# Patient Record
Sex: FEMALE | Race: WHITE | NOT HISPANIC OR LATINO | URBAN - METROPOLITAN AREA
[De-identification: names, ages, dates, MRNs, and addresses within clinical notes are randomized per-mention and may not be internally consistent; named-entity substitution may affect disease eponyms.]

---

## 2023-03-18 LAB
ALANINE AMINOTRANSFERASE (SGPT) (U/L) IN SER/PLAS: 13 U/L (ref 7–45)
ALBUMIN (G/DL) IN SER/PLAS: 4.5 G/DL (ref 3.4–5)
ALKALINE PHOSPHATASE (U/L) IN SER/PLAS: 65 U/L (ref 33–110)
ASPARTATE AMINOTRANSFERASE (SGOT) (U/L) IN SER/PLAS: 17 U/L (ref 9–39)
BASOPHILS (10*3/UL) IN BLOOD BY AUTOMATED COUNT: 0.04 X10E9/L (ref 0–0.1)
BASOPHILS/100 LEUKOCYTES IN BLOOD BY AUTOMATED COUNT: 0.7 % (ref 0–2)
BILIRUBIN DIRECT (MG/DL) IN SER/PLAS: 0.1 MG/DL (ref 0–0.3)
BILIRUBIN TOTAL (MG/DL) IN SER/PLAS: 0.5 MG/DL (ref 0–1.2)
CHOLESTEROL (MG/DL) IN SER/PLAS: 256 MG/DL (ref 0–199)
EOSINOPHILS (10*3/UL) IN BLOOD BY AUTOMATED COUNT: 0.11 X10E9/L (ref 0–0.7)
EOSINOPHILS/100 LEUKOCYTES IN BLOOD BY AUTOMATED COUNT: 1.8 % (ref 0–6)
ERYTHROCYTE DISTRIBUTION WIDTH (RATIO) BY AUTOMATED COUNT: 11.7 % (ref 11.5–14.5)
ERYTHROCYTE MEAN CORPUSCULAR HEMOGLOBIN CONCENTRATION (G/DL) BY AUTOMATED: 31.4 G/DL (ref 32–36)
ERYTHROCYTE MEAN CORPUSCULAR VOLUME (FL) BY AUTOMATED COUNT: 93 FL (ref 80–100)
ERYTHROCYTES (10*6/UL) IN BLOOD BY AUTOMATED COUNT: 4.69 X10E12/L (ref 4–5.2)
HEMATOCRIT (%) IN BLOOD BY AUTOMATED COUNT: 43.7 % (ref 36–46)
HEMOGLOBIN (G/DL) IN BLOOD: 13.7 G/DL (ref 12–16)
IMMATURE GRANULOCYTES/100 LEUKOCYTES IN BLOOD BY AUTOMATED COUNT: 0.7 % (ref 0–0.9)
LEUKOCYTES (10*3/UL) IN BLOOD BY AUTOMATED COUNT: 6 X10E9/L (ref 4.4–11.3)
LYMPHOCYTES (10*3/UL) IN BLOOD BY AUTOMATED COUNT: 2.07 X10E9/L (ref 1.2–4.8)
LYMPHOCYTES/100 LEUKOCYTES IN BLOOD BY AUTOMATED COUNT: 34.6 % (ref 13–44)
MONOCYTES (10*3/UL) IN BLOOD BY AUTOMATED COUNT: 0.44 X10E9/L (ref 0.1–1)
MONOCYTES/100 LEUKOCYTES IN BLOOD BY AUTOMATED COUNT: 7.4 % (ref 2–10)
NEUTROPHILS (10*3/UL) IN BLOOD BY AUTOMATED COUNT: 3.28 X10E9/L (ref 1.2–7.7)
NEUTROPHILS/100 LEUKOCYTES IN BLOOD BY AUTOMATED COUNT: 54.8 % (ref 40–80)
NRBC (PER 100 WBCS) BY AUTOMATED COUNT: 0 /100 WBC (ref 0–0)
PLATELETS (10*3/UL) IN BLOOD AUTOMATED COUNT: 258 X10E9/L (ref 150–450)
PROTEIN TOTAL: 6.7 G/DL (ref 6.4–8.2)
TRIGLYCERIDE (MG/DL) IN SER/PLAS: 87 MG/DL (ref 0–149)

## 2023-06-14 LAB
ALANINE AMINOTRANSFERASE (SGPT) (U/L) IN SER/PLAS: 16 U/L (ref 7–45)
ALBUMIN (G/DL) IN SER/PLAS: 4.6 G/DL (ref 3.4–5)
ALKALINE PHOSPHATASE (U/L) IN SER/PLAS: 80 U/L (ref 33–110)
ASPARTATE AMINOTRANSFERASE (SGOT) (U/L) IN SER/PLAS: 21 U/L (ref 9–39)
BASOPHILS (10*3/UL) IN BLOOD BY AUTOMATED COUNT: 0.04 X10E9/L (ref 0–0.1)
BASOPHILS/100 LEUKOCYTES IN BLOOD BY AUTOMATED COUNT: 0.6 % (ref 0–2)
BILIRUBIN DIRECT (MG/DL) IN SER/PLAS: 0.1 MG/DL (ref 0–0.3)
BILIRUBIN TOTAL (MG/DL) IN SER/PLAS: 0.7 MG/DL (ref 0–1.2)
CHOLESTEROL (MG/DL) IN SER/PLAS: 268 MG/DL (ref 0–199)
EOSINOPHILS (10*3/UL) IN BLOOD BY AUTOMATED COUNT: 0.14 X10E9/L (ref 0–0.7)
EOSINOPHILS/100 LEUKOCYTES IN BLOOD BY AUTOMATED COUNT: 1.9 % (ref 0–6)
ERYTHROCYTE DISTRIBUTION WIDTH (RATIO) BY AUTOMATED COUNT: 12.4 % (ref 11.5–14.5)
ERYTHROCYTE MEAN CORPUSCULAR HEMOGLOBIN CONCENTRATION (G/DL) BY AUTOMATED: 31.4 G/DL (ref 32–36)
ERYTHROCYTE MEAN CORPUSCULAR VOLUME (FL) BY AUTOMATED COUNT: 94 FL (ref 80–100)
ERYTHROCYTES (10*6/UL) IN BLOOD BY AUTOMATED COUNT: 4.86 X10E12/L (ref 4–5.2)
HEMATOCRIT (%) IN BLOOD BY AUTOMATED COUNT: 45.5 % (ref 36–46)
HEMOGLOBIN (G/DL) IN BLOOD: 14.3 G/DL (ref 12–16)
IMMATURE GRANULOCYTES/100 LEUKOCYTES IN BLOOD BY AUTOMATED COUNT: 0.7 % (ref 0–0.9)
LEUKOCYTES (10*3/UL) IN BLOOD BY AUTOMATED COUNT: 7.2 X10E9/L (ref 4.4–11.3)
LYMPHOCYTES (10*3/UL) IN BLOOD BY AUTOMATED COUNT: 2.23 X10E9/L (ref 1.2–4.8)
LYMPHOCYTES/100 LEUKOCYTES IN BLOOD BY AUTOMATED COUNT: 30.9 % (ref 13–44)
MONOCYTES (10*3/UL) IN BLOOD BY AUTOMATED COUNT: 0.55 X10E9/L (ref 0.1–1)
MONOCYTES/100 LEUKOCYTES IN BLOOD BY AUTOMATED COUNT: 7.6 % (ref 2–10)
NEUTROPHILS (10*3/UL) IN BLOOD BY AUTOMATED COUNT: 4.2 X10E9/L (ref 1.2–7.7)
NEUTROPHILS/100 LEUKOCYTES IN BLOOD BY AUTOMATED COUNT: 58.3 % (ref 40–80)
NRBC (PER 100 WBCS) BY AUTOMATED COUNT: 0 /100 WBC (ref 0–0)
PLATELETS (10*3/UL) IN BLOOD AUTOMATED COUNT: 277 X10E9/L (ref 150–450)
PROTEIN TOTAL: 7.1 G/DL (ref 6.4–8.2)
TRIGLYCERIDE (MG/DL) IN SER/PLAS: 110 MG/DL (ref 0–149)

## 2023-10-31 ENCOUNTER — DOCUMENTATION (OUTPATIENT)
Dept: UROLOGY | Facility: CLINIC | Age: 48
End: 2023-10-31
Payer: COMMERCIAL

## 2023-10-31 DIAGNOSIS — N30.10 INTERSTITIAL CYSTITIS: ICD-10-CM

## 2023-10-31 DIAGNOSIS — R39.89 BLADDER PAIN: ICD-10-CM

## 2023-10-31 DIAGNOSIS — R35.1 NOCTURIA: Primary | ICD-10-CM

## 2023-10-31 RX ORDER — AMITRIPTYLINE HYDROCHLORIDE 10 MG/1
10 TABLET, FILM COATED ORAL NIGHTLY
Qty: 90 TABLET | Refills: 3 | Status: SHIPPED | OUTPATIENT
Start: 2023-10-31 | End: 2024-10-30

## 2023-11-22 ENCOUNTER — LAB (OUTPATIENT)
Dept: LAB | Facility: LAB | Age: 48
End: 2023-11-22
Payer: COMMERCIAL

## 2023-11-22 DIAGNOSIS — R39.89 BLADDER PAIN: ICD-10-CM

## 2023-11-22 DIAGNOSIS — R35.1 NOCTURIA: ICD-10-CM

## 2023-11-22 PROCEDURE — 81003 URINALYSIS AUTO W/O SCOPE: CPT

## 2023-11-23 LAB
APPEARANCE UR: CLEAR
BILIRUB UR STRIP.AUTO-MCNC: NEGATIVE MG/DL
COLOR UR: YELLOW
GLUCOSE UR STRIP.AUTO-MCNC: NEGATIVE MG/DL
KETONES UR STRIP.AUTO-MCNC: NEGATIVE MG/DL
LEUKOCYTE ESTERASE UR QL STRIP.AUTO: NEGATIVE
NITRITE UR QL STRIP.AUTO: NEGATIVE
PH UR STRIP.AUTO: 6 [PH]
PROT UR STRIP.AUTO-MCNC: NEGATIVE MG/DL
RBC # UR STRIP.AUTO: NEGATIVE /UL
SP GR UR STRIP.AUTO: 1.01
UROBILINOGEN UR STRIP.AUTO-MCNC: <2 MG/DL

## 2024-03-25 ENCOUNTER — LAB (OUTPATIENT)
Dept: LAB | Facility: LAB | Age: 49
End: 2024-03-25
Payer: COMMERCIAL

## 2024-03-25 DIAGNOSIS — Z00.00 ENCOUNTER FOR GENERAL ADULT MEDICAL EXAMINATION WITHOUT ABNORMAL FINDINGS: Primary | ICD-10-CM

## 2024-03-25 PROCEDURE — 84132 ASSAY OF SERUM POTASSIUM: CPT

## 2024-03-25 PROCEDURE — 36415 COLL VENOUS BLD VENIPUNCTURE: CPT

## 2024-03-26 LAB — POTASSIUM SERPL-SCNC: 4 MMOL/L (ref 3.5–5.3)

## 2024-06-04 ENCOUNTER — LAB (OUTPATIENT)
Dept: LAB | Facility: LAB | Age: 49
End: 2024-06-04
Payer: COMMERCIAL

## 2024-06-04 DIAGNOSIS — Z85.828 PERSONAL HISTORY OF OTHER MALIGNANT NEOPLASM OF SKIN: Primary | ICD-10-CM

## 2024-06-04 LAB
ALBUMIN SERPL BCP-MCNC: 4.6 G/DL (ref 3.4–5)
ALP SERPL-CCNC: 71 U/L (ref 33–110)
ALT SERPL W P-5'-P-CCNC: 12 U/L (ref 7–45)
ANION GAP SERPL CALC-SCNC: 14 MMOL/L (ref 10–20)
AST SERPL W P-5'-P-CCNC: 19 U/L (ref 9–39)
BASOPHILS # BLD AUTO: 0.04 X10*3/UL (ref 0–0.1)
BASOPHILS NFR BLD AUTO: 0.7 %
BILIRUB SERPL-MCNC: 0.4 MG/DL (ref 0–1.2)
BUN SERPL-MCNC: 12 MG/DL (ref 6–23)
CALCIUM SERPL-MCNC: 9.5 MG/DL (ref 8.6–10.6)
CHLORIDE SERPL-SCNC: 105 MMOL/L (ref 98–107)
CHOLEST SERPL-MCNC: 253 MG/DL (ref 0–199)
CHOLESTEROL/HDL RATIO: 3.5
CO2 SERPL-SCNC: 28 MMOL/L (ref 21–32)
CREAT SERPL-MCNC: 0.76 MG/DL (ref 0.5–1.05)
EGFRCR SERPLBLD CKD-EPI 2021: >90 ML/MIN/1.73M*2
EOSINOPHIL # BLD AUTO: 0.23 X10*3/UL (ref 0–0.7)
EOSINOPHIL NFR BLD AUTO: 4.1 %
ERYTHROCYTE [DISTWIDTH] IN BLOOD BY AUTOMATED COUNT: 11.9 % (ref 11.5–14.5)
GLUCOSE SERPL-MCNC: 97 MG/DL (ref 74–99)
HCT VFR BLD AUTO: 42.9 % (ref 36–46)
HDLC SERPL-MCNC: 73 MG/DL
HGB BLD-MCNC: 13.6 G/DL (ref 12–16)
IMM GRANULOCYTES # BLD AUTO: 0.02 X10*3/UL (ref 0–0.7)
IMM GRANULOCYTES NFR BLD AUTO: 0.4 % (ref 0–0.9)
LDLC SERPL CALC-MCNC: 161 MG/DL
LYMPHOCYTES # BLD AUTO: 2.22 X10*3/UL (ref 1.2–4.8)
LYMPHOCYTES NFR BLD AUTO: 39.3 %
MCH RBC QN AUTO: 29.2 PG (ref 26–34)
MCHC RBC AUTO-ENTMCNC: 31.7 G/DL (ref 32–36)
MCV RBC AUTO: 92 FL (ref 80–100)
MONOCYTES # BLD AUTO: 0.5 X10*3/UL (ref 0.1–1)
MONOCYTES NFR BLD AUTO: 8.8 %
NEUTROPHILS # BLD AUTO: 2.64 X10*3/UL (ref 1.2–7.7)
NEUTROPHILS NFR BLD AUTO: 46.7 %
NON HDL CHOLESTEROL: 180 MG/DL (ref 0–149)
NRBC BLD-RTO: 0 /100 WBCS (ref 0–0)
PLATELET # BLD AUTO: 198 X10*3/UL (ref 150–450)
POTASSIUM SERPL-SCNC: 4.5 MMOL/L (ref 3.5–5.3)
PROT SERPL-MCNC: 6.9 G/DL (ref 6.4–8.2)
RBC # BLD AUTO: 4.66 X10*6/UL (ref 4–5.2)
SODIUM SERPL-SCNC: 142 MMOL/L (ref 136–145)
TRIGL SERPL-MCNC: 94 MG/DL (ref 0–149)
VLDL: 19 MG/DL (ref 0–40)
WBC # BLD AUTO: 5.7 X10*3/UL (ref 4.4–11.3)

## 2024-06-04 PROCEDURE — 85025 COMPLETE CBC W/AUTO DIFF WBC: CPT

## 2024-06-04 PROCEDURE — 80061 LIPID PANEL: CPT

## 2024-06-04 PROCEDURE — 36415 COLL VENOUS BLD VENIPUNCTURE: CPT

## 2024-06-04 PROCEDURE — 80053 COMPREHEN METABOLIC PANEL: CPT

## 2024-11-03 DIAGNOSIS — N30.10 INTERSTITIAL CYSTITIS: ICD-10-CM

## 2024-11-03 DIAGNOSIS — R35.1 NOCTURIA: ICD-10-CM

## 2024-11-05 NOTE — TELEPHONE ENCOUNTER
Detailed message left on pt VM asking if refill is needed. She will need her virtual annual fu scheduled.

## 2024-11-08 ENCOUNTER — TELEMEDICINE (OUTPATIENT)
Dept: UROLOGY | Facility: CLINIC | Age: 49
End: 2024-11-08
Payer: COMMERCIAL

## 2024-11-08 DIAGNOSIS — R35.1 NOCTURIA: ICD-10-CM

## 2024-11-08 DIAGNOSIS — N94.19 DYSPAREUNIA DUE TO MEDICAL CONDITION IN FEMALE: ICD-10-CM

## 2024-11-08 DIAGNOSIS — N30.10 INTERSTITIAL CYSTITIS: Primary | ICD-10-CM

## 2024-11-08 DIAGNOSIS — N32.81 OVERACTIVE BLADDER: ICD-10-CM

## 2024-11-08 PROCEDURE — 99442 PR PHYS/QHP TELEPHONE EVALUATION 11-20 MIN: CPT | Performed by: NURSE PRACTITIONER

## 2024-11-08 RX ORDER — AMITRIPTYLINE HYDROCHLORIDE 10 MG/1
10 TABLET, FILM COATED ORAL NIGHTLY
Qty: 30 TABLET | Refills: 11 | OUTPATIENT
Start: 2024-11-08

## 2024-11-08 RX ORDER — AMITRIPTYLINE HYDROCHLORIDE 10 MG/1
10 TABLET, FILM COATED ORAL NIGHTLY
Qty: 90 TABLET | Refills: 3 | Status: SHIPPED | OUTPATIENT
Start: 2024-11-08 | End: 2025-11-08

## 2024-11-08 NOTE — PROGRESS NOTES
11/08/24   [id number]    IC/OAB and dyspareunia    Virtual or Telephone Consent    A telephone visit (audio only) between the patient (at the originating site) and the provider (at the distant site) was utilized to provide this telehealth service.   Verbal consent was requested and obtained from Nell Kim on this date, 11/08/24 for a telehealth visit.        Subjective      HPI Nell Kim is a 49 y.o. 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;     No dyspareunia, no pelvic pain, now  No longer with [facility]NOR with exercises  Still taking amitriptyline 10 mg at bedtime, happy with this medicine, considering going off but low dose and doing well, doesn't want jeopardize how well she is doing;     No UTIs or hematuria;     Daughter in  now, working 3 days a week at her school now;     Parathyroidectomy done after blood work showed elevated calcium; blood work done for exhaustion;     December/Jan 2023 pneumonia-almost sepsis, that is when they found the kidney stones; no UTIs; no hematuria; stones r/t parathyroid;     12/8/23 CT abd/pel w IV Kidneys: At the superior pole of the left kidney, there is a 7 mm   calculus. At the inferior pole of the left kidney, there is an 8 mm calculus. No hydronephrosis.     Surgery done on kidney stones;     5/1/24 rbc 0-3/hpf normal range at [facility];   11/22/23 UA negative    PMH, PSH, FH, SH reviewed.    Objective     There were no vitals taken for this visit.   Physical Exam  Deferred for phone visit  Assessment/Plan   Problem List Items Addressed This Visit    None  Visit Diagnoses       Interstitial cystitis    -  Primary    Relevant Medications    amitriptyline (Elavil) 10 mg tablet    Nocturia        Relevant Medications    amitriptyline (Elavil) 10 mg tablet    Overactive bladder        Dyspareunia due to medical condition in female              No orders of the  defined types were placed in this encounter.     Refill amitriptyline given  Follow up next July in [place] yearly follow up  Nurse line [phone]     Appt. [place] next summer end of July T or Th 8:20 slot  DOM Pyle-CNP  Lab Results   Component Value Date    GLUCOSE 97 06/04/2024    CALCIUM 9.5 06/04/2024     06/04/2024    K 4.5 06/04/2024    CO2 28 06/04/2024     06/04/2024    BUN 12 06/04/2024    CREATININE 0.76 06/04/2024

## 2024-11-08 NOTE — PATIENT INSTRUCTIONS
Refill amitriptyline given  Follow up next July in [place] yearly follow up  Nurse line [phone]

## 2025-02-24 DIAGNOSIS — N30.10 INTERSTITIAL CYSTITIS: ICD-10-CM

## 2025-02-24 DIAGNOSIS — R35.1 NOCTURIA: ICD-10-CM

## 2025-02-24 RX ORDER — AMITRIPTYLINE HYDROCHLORIDE 10 MG/1
10 TABLET, FILM COATED ORAL NIGHTLY
Qty: 90 TABLET | Refills: 3 | Status: SHIPPED | OUTPATIENT
Start: 2025-02-24 | End: 2026-02-24

## 2025-02-24 NOTE — TELEPHONE ENCOUNTER
Pharmacy left message stating there was a misunderstanding regarding her Amitriptyline rx. Looks like her PCP had increased the dose to 25mg so they discontinued the 10mg from us but the pt states the 25mg was giving her side effects so she wants to go back on the 10mg at night. Pharmacy did encourage her to continue to speak with us regarding the medication and not more than one provider so mistakes like this can be avoided. Ashley, please resend 10mg, 90 days to [facility], thanks.   verbal cues/nonverbal cues (demo/gestures)/2 person assist 60

## 2025-05-05 ENCOUNTER — TELEMEDICINE CLINICAL SUPPORT (OUTPATIENT)
Facility: CLINIC | Age: 50
End: 2025-05-05
Payer: COMMERCIAL

## 2025-05-05 DIAGNOSIS — Z86.39 HISTORY OF HYPERPARATHYROIDISM: ICD-10-CM

## 2025-05-05 DIAGNOSIS — Z80.9 FAMILY HX-MALIGNANCY: ICD-10-CM

## 2025-05-05 DIAGNOSIS — Z80.8 FAMILY HISTORY OF MELANOMA: ICD-10-CM

## 2025-05-05 DIAGNOSIS — Z85.828 HX OF SKIN CANCER, BASAL CELL: ICD-10-CM

## 2025-05-05 DIAGNOSIS — Z71.83 ENCOUNTER FOR NONPROCREATIVE GENETIC COUNSELING AND TESTING: Primary | ICD-10-CM

## 2025-05-05 DIAGNOSIS — Z13.71 ENCOUNTER FOR NONPROCREATIVE GENETIC COUNSELING AND TESTING: Primary | ICD-10-CM

## 2025-05-05 DIAGNOSIS — Z85.820 HISTORY OF MELANOMA: ICD-10-CM

## 2025-05-05 PROCEDURE — 96041 GENETIC COUNSELING SVC EA 30: CPT | Performed by: GENETIC COUNSELOR, MS

## 2025-05-05 NOTE — PROGRESS NOTES
"History of Present Illness:  Nell Kim is a 50 y.o. female with a personal and family history of melanoma. She was self-was referred to the Cancer Genetics Clinic at [facility], though reports her dermatology provider, GABI Higgins, recommended she consider genetic testing. Nell is interested in genetic testing to clarify their personal risk for cancer, as well as the risks to their family members.    Cancer Medical History:  Personal history of cancer? Yes.  Type: Skin cancers (melanoma & basal cell skin cancers).  Summary:  (1) Basal cell skin cancers:  First dx in her mid 30. Estimates 15-20 total, chest, back, stomach.  (2) Melanoma:  First dx at age 45. Melanoma right arm & upper quad (thinks \"Level `\" for both of them). No lymph nodes removed.    Brown hair, green eyes, fair skin, freckles, burns easily. Similar colorings in family members. 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 Cannot recall any significant sunburns. No known history of dysplastic nevi per patient, but pathology not available for review a time of today's visit.    History of other cancers: No.    Prior hereditary cancer (germline) genetic testing? No.    Other health history:  S/p parathyroidectomy due to hyperparathyroidism with pathology showing hypercellular parathyroid (4/2024).    Other:  No tobacco, no alcohol. Works as .    Cancer screening history:  Mammograms? Yes, yearly.  Breast biopsy? No.  PAP smear? Yes, per gyn provider. No known history of abnormal Pap smears.  Colonoscopy? Yes, reports last 10/2025. No known history of colon polyps.   Upper endoscopy? Yes, remote due to \"stomach issues,\" does not recall any history of gastric polyps.  Dermatology? Yes, every 3 months.  Other cancer screening? No.    Reproductive History:  Number of pregnancies: 6 (SAB x6). No cause found. History of endometriosis.  Menarche (age): " "14 or 15.  Menopause (age): Unknown.  OCP: Yes, for ~10 years.  HRT: No.  Fertility treatments:  Clomid, IUI & IVF, multiple medications.    Hysterectomy? Yes, due to \"irregular\" fibroid. 2019.  Oophorectomy? One ovary intact.    Family history:  A 4-generation pedigree was obtained and was significant for the following:    -Patient with skin cancer history per the above;  -Sister, melanoma x1, multiple basal cell skin cancers, alive at age 53;  -Father, melanoma (multiple?) on ears/face/back, now with metastatic disease to the liver;  -Paternal uncle, \"liver cancer,\"  in his 50s;  -Paterna aunt, \"liver cancer,\"  in her 50s;  -Paternal grandfather, cancer NOS (liver?) in his 80s,  in his 80s;  -Paternal great grandmother (PGM's mother), breast cancer in her 50s+,  in her 90s;  -Mother, melanoma x1, also history of basal cell skin cancers, alive at age 78;  -No other known maternal family history of cancer.    Paternal ancestry is Dominican/Marshallese/English. Maternal ancestry is Malawian/English (?). There is no known Ashkenazi Muslim ancestry or consanguinity.    Genetic counseling:  Nell is a 50 y.o. female with a personal history of melanomas and basal cell skin cancers, as well as a family history of melanoma. Based on her personal and family history, Nell likely meets NCCN criteria for testing for hereditary cutaneous melanoma risk genes (e.g., CDKN2A). Nell is interested in testing, which is recommended, and was ordered today via the 76-gene CancerNext-Expanded panel from [employer]. Our discussion is summarized below.    We reviewed genes and chromosomes, inherited forms of cancer, and the CDKN2A gene causing familial atypical multiple mole melanoma (FAMMM) syndrome. We discussed that most cancers are not due to an inherited genetic susceptibility. However, in about 5-10% of families, there is an inherited genetic mutation that can make a person more susceptible to developing certain forms of " cancer. Within these families, we often see multiple family members with cancer, occurring in multiple generations. In addition, earlier onset and multiple or bilateral cancers are can be suggestive of an inherited form of cancer. Finally, there is a clustering of certain types of cancer or tumors in these families, such as pancreatic cancer and melanomas.     We discussed the CDKN2A gene, which is related to FAMMM, makes up about 40% of hereditary melanomas. FAMMM has been linked to pancreatic cancers and melanomas. Changes in this gene (sometimes referred to as mutations) are inherited in a dominant pattern and have a 28-76% lifetime risk of developing a melanoma at some point in their lifetime (exact risk is dependent on other risk factors such as family history, geographic locations, etc.) Additionally, CDKN2A has been associated with a >15% lifetime risk of pancreatic cancer (which is elevated from the general population risk of 1%).    Individuals with a clinical diagnosis of FAMMM have (1) A cutaneous melanoma in one or more first- or second-degree relatives, (2) a high total body nevi count (>50) AND, (3) multiple atypical nevi (i.e. asymmetry, subepidermal fibroplasia, lentiginous melanocytic hyperplasia with spindle or epithelioid melanocytes, variable dermal lymphocyte infiltration and presence of “shouldering” phenomenon).    Due to these risks, individuals diagnosed with FAMMM should be followed more closely for related findings. This includes comprehensive skin examination by a dermatologist, supplemented with total body photography and dermoscopy biannually, and consideration of pancreatic cancer screening (particularly in the setting of a family history of pancreatic cancer).    Gene mutations in most cancer genes, i.e. CDKN2A, are inherited in an autosomal dominant fashion. This means that if an individual has a change in either of these genes, their parents, siblings, and children have a 50% chance  of also having that gene change and a 50% chance of not having the gene change.     We reviewed the three results we can get back:  1. Positive- Identified a change in a cancer gene that confers an increased cancer risk. We will discuss potential changes in management for Nell and her family based on the specific gene mutation found.  2. Negative- Clears Nell for a majority of predisposition cancer syndromes that we are aware of, but cannot clear her for any/all cancer predisposition risks. She would continue to be screened based on his personal and family history.  3. Variant of Uncertain Significance (VUS)- We discussed should an uncertain result come back that this would be treated like a negative result (i.e. no management recommendation will be made no familial variant testing) as the implications of this finding are currently unknown.    Lastly, we discussed the  Genetic Information Nondiscrimination Act (ALAYNA). We discussed the protections and limitations of ALAYNA. ALAYNA generally makes in illegal for health insurance companies, group health plans, and most employers (with >15 employees) to discriminate based on genetic information. It does not protect against genetic discrimination for life insurance, disability insurance, long-term care, or other insurances.    Nell was counseled about hereditary cancer susceptibility including cancer risks, options for increased screening and/or risk reduction, genetic testing, and the implications for other family members. We discussed performing genetic testing in the context of a broader panel test that looks at a number of different genes associated with hereditary cancer, including melanoma. Of note, this panel would also look at genes that can be associated primary hyperparathyroidism, such as MEN1, CDKN1B, RET, & CDC73. This may be relevant, given Nell's history of hyperparathyroidism (with a pathologic diagnosis of hypercellular  parathyroid). However, only around  10% of primary hyperparathyroidism is due to a detectable hereditary cause.    As noted above, we discussed the [employer] CancerNext-Expanded panel, which examines the following 77 genes:  AIP, ALK, APC, MAYRA, AXIN2, BAP1, BARD1, BMPR1A, BRCA1, BRCA2, BRIP1, CDC73, CDH1, CDK4, CDKN1B, CDKN2A, CEBPA, CHEK2, CTNNA1, DDX41, DICER1, EGFR, EPCAM, ETV6, FH, FLCN, GATA2, GREM1, HOXB13, KIT, LZTR1, MAX, MBD4, MEN1, MET, MITF, MLH1, MSH2, MSH3, MSH6, MUTYH, NF1, NF2, NTHL1, PALB2, PDGFRA, PHOX2B, PMS2, POLD1, POLE, POT1, JLAHF0Q, PTCH1, PTEN, RAD51C, RAD51D, RB1, RET, RPS20, RUNX1, SDHA, SDHAF2, SDHB, SDHC, SDHD, SMAD4, SMARCA4, SMARCB1, SMARCE1, STK11, SUFU, CEMM777, TP53, TSC1, TSC2, VHL, WT1.    Nell elected to undergo genetic testing via the panel test described above. She elected the self-pay ($249) option. Oral consent for testing was obtained. An [facility] DNA/RNA blood test kit will be sent to Nell's home. They will then bring the test kit to a  outpatient location to have their blood drawn for testing (using the test tubes provided in the kit). The sample will then be sent to [facility] for analysis.    Results are typically available in 3 weeks from the time of blood draw. We agreed to call out the results to Nell when they are available. Should she be found to have a pathogenic or likely pathogenic variant in any of the genes examined, a follow-up visit will be recommended. At that time, we would make recommendations for both Nell and her family members in terms of cancer screening and/or cancer risk reduction options.       Plan:  1.  Nell elected to undergo genetic testing via the [employer] CancerNext-Expanded panel test described above. Oral consent for testing was obtained. An [facility] DNA/RNA blood test kit will be sent to Nell's home. They will then bring the test kit to a  outpatient location to have their blood drawn for testing (using the test tubes provided in the kit). The sample will then be sent to [facility] for analysis.    2. Results are typically available in 3 weeks from the time of blood draw. We agreed to call out the results to Nell when they are available. Should she be found to have a pathogenic or likely pathogenic variant in any of the genes examined, a follow-up visit will be recommended    3. We remain available to Nell at [phone] if any questions arise regarding information discussed at today's visit. Nahomy can be reached directly at [phone].    Nahomy Cristina MS, [facility] [place]  Genetic Counselor  [facility]  [phone]    Self-pay  77 cancer expanded    Total time spent on day of encounter: 67 minutes (46 minutes with patient, 21 minutes on pre/post patient care activities, including documentation).     Virtual or Telephone Consent    An interactive audio and video telecommunication system which permits real time communications between the patient (at the originating site) and provider (at the distant site) was utilized to provide this telehealth service.   Verbal consent was requested and obtained from Nell Kim on this date, 05/05/25 for a telehealth visit and the patient's location was confirmed at the time of the visit.

## 2025-06-06 ENCOUNTER — DOCUMENTATION (OUTPATIENT)
Dept: GENETICS | Facility: CLINIC | Age: 50
End: 2025-06-06
Payer: COMMERCIAL

## 2025-06-06 NOTE — PROGRESS NOTES
"Cancer Genetics Chart Update (telephone results note):  Spoke with Nell Kim by telephone to review her recent hereditary cancer genetic test results, which were NEGATIVE (normal). She underwent testing because of her personal and family history of melanoma. She has access to her results via her  [facility].    Family history (as previously noted):     -Patient with skin cancer history per the above;  -Sister, melanoma x1, multiple basal cell skin cancers, alive at age 53;  -Father, melanoma (multiple?) on ears/face/back, now with metastatic disease to the liver;  -Paternal uncle, \"liver cancer,\"  in his 50s;  -Paterna aunt, \"liver cancer,\"  in her 50s;  -Paternal grandfather, cancer NOS (liver?) in his 80s,  in his 80s;  -Paternal great grandmother (PGM's mother), breast cancer in her 50s+,  in her 90s;  -Mother, melanoma x1, also history of basal cell skin cancers, alive at age 78;  -No other known maternal family history of cancer.     Paternal ancestry is [place]ian/Emirati/English. Maternal ancestry is [place] citizen/English (?). There is no known Ashkenazi Samaritan ancestry or consanguinity.    Genetic testing:  Negative 77-gene [facility] CancerNext-Expanded + [employer] panel, report date 2025.    Genes Analyzed (77 total): AIP, ALK, APC, MAYRA, BAP1, BARD1, BMPR1A, BRCA1, BRCA2, BRIP1, CDC73, CDH1, CDK4, CDKN1B, CDKN2A, CEBPA, CHEK2, DICER1, ETV6, FH, FLCN, GATA2, LZTR1, MAX, MEN1, MET, MLH1, MSH2, MSH6, MUTYH, NF1, NF2, NTHL1, PALB2, PHOX2B, PMS2, POT1, QYAHE7N, PTCH1, PTEN, RAD51C, RAD51D, RB1, RET, RPS20, RUNX1, SDHA, SDHAF2, SDHB, SDHC, SDHD, SMAD4, SMARCA4, SMARCB1, SMARCE1, STK11, SUFU, EUYT806, TP53, TSC1, TSC2, VHL and WT1 (sequencing and deletion/duplication); AXIN2, CTNNA1, DDX41, EGFR, HOXB13, KIT, MBD4, MITF, MSH3, PDGFRA, POLD1 and POLE (sequencing only); EPCAM and GREM1 (deletion/duplication only). RNA data is routinely analyzed for use in variant interpretation for all genes.    Results " summarized at the bottom of this note, as well as attached to the encounter.    Genetic counseling:  Nell's results were NEGATIVE, meaning that no disease causing mutations were identified. A genetic cause for her personal and/or family history of cancer has not been identified.    There are several possible explanations for negative genetic test results:  -Cancer is common. Cancer may be due to chance and/or environmental factors.  -Some families have clustering of cancer without an identifiable hereditary cause.  In these families, the cancers are likely attributed to shared genetic, environmental, and lifestyle factors.    -It is estimated that a small percentage of mutations are not detected with the utilized testing technology. These negative results make a hereditary cancer syndrome less likely, but they do not entirely rule out the possibility of a mutation in one of the tested genes that could not be detected.  -It is possible that there could be a mutation in a gene that has not yet been linked to hereditary cancer or was not tested.    One reason Nell underwent genetic testing was to better understand her risk to develop other cancers, to help determine if additional cancer screenings and/or other interventions are needed. Because her results were negative, Nell does not have an identifiable genetic risk factor that places her at an increased risk to develop other cancers. She should continue to follow the recomendations of her dermatology provider for skin cancer screenings, etc.    We also discussed that Nell should follow her primary care providers' recommendations for all other age-related cancer screenings, such as colonoscopies, etc.    Although her results were negative,we reviewed that Nell's relatives are at increased risk for skin cancers, including melanoma, based on the family history alone. We reviewed the importance of skin cancer screenings for Nell and her relatives, including her daughter.  We discussed that it would be reasonable for her daughter to have an initial skin cancer screening as a teenager, and then more regularly in her 20s and beyond. In the meantime, Nell was encouraged to have her daughter minimize her sun exposure to protect her skin from harmful ultraviolet (UV) rays. This includes wearing sunscreen with an SPF of 30 or higher, wearing protective clothing, seeking shade, and avoiding tanning beds.    A brief family history of Nell's children's father (her ) was obtained. Both of his grandmothers had breast cancer, but at an older age. We reviewed that this history was not really suggestive of hereditary cancer, so no further genetic testing or additional cancer screenings are recommended for their children.    Our understanding of genetic contribution to cancer diagnoses is always evolving, so there may be additional testing recommended in the future. Ms. Kim was asked to keep us apprised as to any changes to their personal and/or family history of cancer, and to contact us in 2-3 years to determine if there have been any changes since our discussion today.    Nahomy Cristina MS, [facility] [place]  Genetic Counselor  [facility]  [phone]

## 2025-07-08 DIAGNOSIS — R30.0 DYSURIA: Primary | ICD-10-CM

## 2025-07-10 LAB
APPEARANCE UR: CLEAR
BACTERIA #/AREA URNS HPF: NORMAL /HPF
BACTERIA UR CULT: NORMAL
BILIRUB UR QL STRIP: NEGATIVE
COLOR UR: YELLOW
GLUCOSE UR QL STRIP: NEGATIVE
HGB UR QL STRIP: NEGATIVE
HYALINE CASTS #/AREA URNS LPF: NORMAL /LPF
KETONES UR QL STRIP: NEGATIVE
LEUKOCYTE ESTERASE UR QL STRIP: NEGATIVE
NITRITE UR QL STRIP: NEGATIVE
PH UR STRIP: 7.5 [PH] (ref 5–8)
PROT UR QL STRIP: NEGATIVE
RBC #/AREA URNS HPF: NORMAL /HPF
SERVICE CMNT-IMP: NORMAL
SP GR UR STRIP: 1.02 (ref 1–1.03)
SQUAMOUS #/AREA URNS HPF: NORMAL /HPF
WBC #/AREA URNS HPF: NORMAL /HPF

## 2025-07-11 ENCOUNTER — APPOINTMENT (OUTPATIENT)
Dept: UROLOGY | Facility: CLINIC | Age: 50
End: 2025-07-11
Payer: COMMERCIAL

## 2025-07-11 DIAGNOSIS — M62.89 PELVIC FLOOR DYSFUNCTION: ICD-10-CM

## 2025-07-11 DIAGNOSIS — R35.0 FREQUENCY OF URINATION: Primary | ICD-10-CM

## 2025-07-11 DIAGNOSIS — N95.8 GENITOURINARY SYNDROME OF MENOPAUSE: ICD-10-CM

## 2025-07-11 DIAGNOSIS — N32.89 BLADDER SPASMS: ICD-10-CM

## 2025-07-11 PROCEDURE — 99214 OFFICE O/P EST MOD 30 MIN: CPT | Performed by: NURSE PRACTITIONER

## 2025-07-11 RX ORDER — ESTRADIOL 0.1 MG/G
CREAM VAGINAL
Qty: 42.5 G | Refills: 5 | Status: SHIPPED | OUTPATIENT
Start: 2025-07-11 | End: 2026-07-11

## 2025-07-11 NOTE — PATIENT INSTRUCTIONS
Gemtesa 75 mg daily  Uribel as needed  Pelvic floor physical therapy  Intimate lizz jones discussed  Pelvic pain suppositories buderer's  Estrogen vaginal cream     Make an appt. In next month in [place], pelvic exam  Has appt. W GYN in September  Nurse line [phone]

## 2025-07-11 NOTE — PROGRESS NOTES
07/11/25   [id number]    IC/OAB and dyspareunia    Virtual or Telephone Consent    A telephone visit (audio only) between the patient (at the originating site) and the provider (at the distant site) was utilized to provide this telehealth service.   Verbal consent was requested and obtained from Nell Kim on this date, 07/11/25 for a telehealth visit.        Subjective      HPI Nell Kim is a 50 y.o. 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 is part of this; Still taking amitriptyline 10 mg at bedtime; last two weeks pyridium every night; 200 mg nightly;     Pelvic pain suppositories, PFPT, uribel in past, myrbetriq in past;   Taking detrol 4 mg from primary care,  no constipation; frequency with the pressure; hasn't done any yoga/pilates or pellaton bike which she had been doing, but not in past month; pellaton bike prior twice weekly;      No UTIs or hematuria;     Also stress with dad has cancer, stress with going back to work;     Past notes: Parathyroidectomy done after blood work showed elevated calcium; blood work done for exhaustion;     December/Jan 2023 pneumonia-almost sepsis, that is when they found the kidney stones; no UTIs; no hematuria; stones r/t parathyroid;     12/8/23 CT abd/pel w IV Kidneys: At the superior pole of the left kidney, there is a 7 mm; calculus. At the inferior pole of the left kidney, there is an 8 mm calculus. No hydronephrosis.      Surgery done on kidney stones;     5/1/24 rbc 0-3/hpf normal range at [facility];   11/22/23 UA negative    PMH, PSH, FH, SH reviewed.    Objective     There were no vitals taken for this visit.   Physical Exam  General: Appears comfortable and in no apparent distress, well nourished  Head: Normocephalic, atraumatic  Neck: trachea midline  Respiratory: respirations unlabored, no wheezes, and no use of accessory muscles  Cardiovascular: at rest no dyspnea, well perfused  Skin: no visible rashes or lesions  Neurologic: grossly intact, oriented to person, place, and time  Psychiatric: mood and affect appropriate  Musculoskeletal: in chair for appt. no difficulty w upper body movement    Assessment/Plan   Problem List Items Addressed This Visit    None  Visit Diagnoses         Frequency of urination    -  Primary    Relevant Medications    vibegron 75 mg tablet      Bladder spasms        Relevant Medications    methen-m.blue-s.phos-phsal-hyo 118-10-40.8-36 mg capsule      Pelvic floor dysfunction        Relevant Orders    Referral to Physical Therapy      Genitourinary syndrome of menopause        Relevant Medications    estradiol (Estrace) 0.01 % (0.1 mg/gram) vaginal cream            Orders Placed This Encounter   Procedures    Referral to Physical Therapy     Standing Status:   Future     Expected Date:   7/11/2025     Expiration Date:   7/11/2026     Referral Priority:   Routine     Referral Type:   Consultation     Referral Reason:   Specialty Services Required     Requested Specialty:   Physical Therapy     Number of Visits Requested:   1      Plan:   1.Refill amitriptyline given earlier in year, may increase to two tablets at bedtime if needed.  2.Gemtesa 75 mg daily-discussed gemtesa/card for coupon program  3.Uribel as needed (may use good rx if needed)  4.Pelvic floor physical therapy  5.Intimate lizz jones discussed  6.Pelvic pain suppositories [facility] (called in to pharmacist Jose at [facility])  .7. Estrogen  vaginal cream     Make an appt. In next month in [place], pelvic exam  Has appt. [facility] in September  Nurse line [phone]             Ashley Myers, APRN-CNP  Lab Results   Component Value Date    GLUCOSE 97 06/04/2024    CALCIUM 9.5 06/04/2024     06/04/2024    K 4.5 06/04/2024    CO2 28 06/04/2024     06/04/2024    BUN 12 06/04/2024    CREATININE 0.76 06/04/2024

## 2025-07-14 LAB
COMMENTS - MP RESULT TYPE: NORMAL
SCAN RESULT: NORMAL

## 2025-07-15 ENCOUNTER — APPOINTMENT (OUTPATIENT)
Dept: UROLOGY | Facility: CLINIC | Age: 50
End: 2025-07-15
Payer: COMMERCIAL

## 2025-07-15 VITALS — DIASTOLIC BLOOD PRESSURE: 74 MMHG | HEART RATE: 90 BPM | SYSTOLIC BLOOD PRESSURE: 109 MMHG

## 2025-07-15 DIAGNOSIS — N32.89 BLADDER SPASMS: ICD-10-CM

## 2025-07-15 DIAGNOSIS — R35.0 FREQUENCY OF URINATION: ICD-10-CM

## 2025-07-15 DIAGNOSIS — M62.89 PELVIC FLOOR DYSFUNCTION: ICD-10-CM

## 2025-07-15 LAB
POC APPEARANCE, URINE: CLEAR
POC BILIRUBIN, URINE: NEGATIVE
POC BLOOD, URINE: NEGATIVE
POC COLOR, URINE: YELLOW
POC GLUCOSE, URINE: NEGATIVE MG/DL
POC KETONES, URINE: NEGATIVE MG/DL
POC LEUKOCYTES, URINE: NEGATIVE
POC NITRITE,URINE: NEGATIVE
POC PH, URINE: 7 PH
POC PROTEIN, URINE: NEGATIVE MG/DL
POC SPECIFIC GRAVITY, URINE: 1.01
POC UROBILINOGEN, URINE: 0.2 EU/DL

## 2025-07-15 PROCEDURE — 81003 URINALYSIS AUTO W/O SCOPE: CPT | Performed by: NURSE PRACTITIONER

## 2025-07-15 PROCEDURE — 51798 US URINE CAPACITY MEASURE: CPT | Performed by: NURSE PRACTITIONER

## 2025-07-15 PROCEDURE — 99213 OFFICE O/P EST LOW 20 MIN: CPT | Performed by: NURSE PRACTITIONER

## 2025-07-15 RX ORDER — SPIRONOLACTONE 50 MG/1
100 TABLET, FILM COATED ORAL
COMMUNITY
Start: 2024-03-26

## 2025-07-15 RX ORDER — RIZATRIPTAN BENZOATE 10 MG/1
TABLET, ORALLY DISINTEGRATING ORAL
COMMUNITY

## 2025-07-15 RX ORDER — ACITRETIN 25 MG/1
CAPSULE ORAL
COMMUNITY

## 2025-07-15 RX ORDER — TIZANIDINE 4 MG/1
TABLET ORAL
COMMUNITY

## 2025-07-15 RX ORDER — BUSPIRONE HYDROCHLORIDE 7.5 MG/1
7.5 TABLET ORAL 2 TIMES DAILY
COMMUNITY
Start: 2025-06-18

## 2025-07-15 RX ORDER — GABAPENTIN 600 MG/1
600 TABLET ORAL
COMMUNITY
Start: 2025-01-20 | End: 2025-07-19

## 2025-07-15 NOTE — PROGRESS NOTES
07/15/25   [id number]    IC/OAB and dyspareunia, pelvic exam     Subjective      HPI Nell Kim is a 50 y.o. female who presents for follow up interstitial cystitis, OAB, pelvic pain, dyspareunia, pelvic exam;     Today 7/15/25 HPI  -just got pelvic pain suppositories today  -just got vaginal estrogen and gemtesa today  -feeling some better since last week  -stayed with Amitriptyline 10 mg dose  -UA negative, PVR 0 ml  -CT abd/pel wo IV contrast: Kidneys: No calculus, hydronephrosis or finding to suggest a cyst or mass in the unenhanced kidney.     PMH, PSH, FH, SH reviewed.    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; Still taking amitriptyline 10 mg at bedtime; last two weeks pyridium every night; 200 mg nightly;     Pelvic pain suppositories, PFPT, uribel in past, myrbetriq in past;   Taking detrol 4 mg from primary care,  no constipation; frequency with the pressure; hasn't done any yoga/pilates or pellaton bike which she had been doing, but not in past month; pellaton bike prior twice weekly;      No UTIs or hematuria;     Also stress with dad has cancer, stress with going back to work;     Past notes: Parathyroidectomy done after blood work showed elevated calcium; blood work done for exhaustion;     December/Jan 2023 pneumonia-almost sepsis, that is when they found the kidney stones; no UTIs; no hematuria; stones r/t parathyroid;     12/8/23 CT abd/pel w IV Kidneys: At the superior pole of the left kidney, there  is a 7 mm; calculus. At the inferior pole of the left kidney, there is an 8 mm calculus. No hydronephrosis.     Surgery done on kidney stones;     5/1/24 rbc 0-3/hpf normal range at [facility];   11/22/23 UA negative        Objective     /74   Pulse 90    Physical Exam  General: Appears comfortable and in no apparent distress, well nourished  Head: Normocephalic, atraumatic  Neck: trachea midline  Respiratory: respirations unlabored, no wheezes, and no use of accessory muscles  Cardiovascular: at rest no dyspnea, well perfused  Skin: no visible rashes or lesions  Neurologic: grossly intact, oriented to person, place, and time  Psychiatric: mood and affect appropriate  Musculoskeletal: in chair for appt. no difficulty w upper body movement     exam  No vulvar lesions, Positive Qtip tenderness, mild to mod atrophy  Neg CST lying down, positive levator ani tenderness L > R; tightness  Stage II cystocele, no rectocele, s/p hysterectomy  Non tender ovaries/uterus,   No rectal exam done      Assessment/Plan   Problem List Items Addressed This Visit    None  Visit Diagnoses         Frequency of urination        Relevant Orders    POCT UA Automated manually resulted (Completed)    Post-Void Residual (Completed)      Bladder spasms        Relevant Orders    POCT UA Automated manually resulted (Completed)    Post-Void Residual (Completed)      Pelvic floor dysfunction        Relevant Orders    POCT UA Automated manually resulted (Completed)    Post-Void Residual (Completed)            Orders Placed This Encounter   Procedures    Post-Void Residual    POCT UA Automated manually resulted     Release result to [facility]:   Immediate [1]      Plan:   1.Refill amitriptyline given earlier in year, may increase to two tablets at bedtime if needed.  2.Gemtesa 75 mg daily-discussed gemtesa/card for coupon program  3.Uribel as needed (may use good rx if needed)  4.Pelvic floor physical therapy-no kegels, work on relaxing   5.Intimate  lizz jones discussed, reviewed online  6.Pelvic pain suppositories [facility]   7. Estrogen vaginal cream, vestibulodynia, may consider testosterone at some point if needed    Virtual follow up 4 weeks Ashley  Has appt. [facility] in September  Nurse line [phone]             Ashley Myers, APRN-CNP  Lab Results   Component Value Date    GLUCOSE 97 06/04/2024    CALCIUM 9.5 06/04/2024     06/04/2024    K 4.5 06/04/2024    CO2 28 06/04/2024     06/04/2024    BUN 12 06/04/2024    CREATININE 0.76 06/04/2024

## 2025-07-15 NOTE — PATIENT INSTRUCTIONS
Plan:   1.Refill amitriptyline given earlier in year, may increase to two tablets at bedtime if needed.  2.Gemtesa 75 mg daily-discussed gemtesa/card for coupon program  3.Uribel as needed (may use good rx if needed)  4.Pelvic floor physical therapy  5.Intimate lizz wandorita discussed  6.Pelvic pain suppositories [facility] (called in to pharmacist Jose at [facility])  .7. Estrogen vaginal cream     Virtual follow up 4 weeks Ashley  Has appt. [facility] in September  Nurse line [phone]

## 2025-08-22 ENCOUNTER — APPOINTMENT (OUTPATIENT)
Dept: UROLOGY | Facility: CLINIC | Age: 50
End: 2025-08-22
Payer: COMMERCIAL

## 2025-08-22 DIAGNOSIS — N95.8 GENITOURINARY SYNDROME OF MENOPAUSE: ICD-10-CM

## 2025-08-22 DIAGNOSIS — N32.89 BLADDER SPASMS: ICD-10-CM

## 2025-08-22 DIAGNOSIS — M62.89 PELVIC FLOOR DYSFUNCTION: ICD-10-CM

## 2025-08-22 DIAGNOSIS — R35.0 FREQUENCY OF URINATION: ICD-10-CM

## 2025-08-22 DIAGNOSIS — N30.10 INTERSTITIAL CYSTITIS: ICD-10-CM

## 2025-08-22 DIAGNOSIS — R10.2 PELVIC PAIN: Primary | ICD-10-CM

## 2025-08-22 PROCEDURE — 99213 OFFICE O/P EST LOW 20 MIN: CPT | Performed by: NURSE PRACTITIONER

## 2026-01-09 ENCOUNTER — APPOINTMENT (OUTPATIENT)
Dept: UROLOGY | Facility: CLINIC | Age: 51
End: 2026-01-09
Payer: COMMERCIAL